# Patient Record
Sex: MALE | Race: WHITE | Employment: OTHER | URBAN - METROPOLITAN AREA
[De-identification: names, ages, dates, MRNs, and addresses within clinical notes are randomized per-mention and may not be internally consistent; named-entity substitution may affect disease eponyms.]

---

## 2016-04-12 LAB
A/G RATIO, EXTERNAL: 1.7
ALBUMIN, EXTERNAL: 4.6
ALK PHOS, EXTERNAL: 65
ANION GAP BLD CALC-SCNC: 10 MMOL/L
BILI DIRECT, EXTERNAL: NORMAL
BILI TOTAL, EXTERNAL: 0.7
BUN BLD-MCNC: 17 MG/DL
BUN/CREATININE RATIO, EXTERNAL: 18
CALCIUM, EXTERNAL: 9.4
CALCIUM, ION, EXTERNAL: NORMAL
CHLORIDE, EXTERNAL: 102
CO2, EXTERNAL: 22
CREATININE SER, EXTERNAL: 0.94
EGFR IF AFA, EXTERNAL: >60
EGFR NOT AFA, EXTERNAL: >60
GLOBULIN, EXTERNAL: 2.7
GLUCOSE SER, EXTERNAL: 109
POTASSIUM, EXTERNAL: 4
PROTEIN TOT, EXTERNAL: 7.3
SGOT (AST), EXTERNAL: 14
SGPT (ALT), EXTERNAL: 19
SODIUM, EXTERNAL: 134

## 2019-03-06 ENCOUNTER — OP HISTORICAL/CONVERTED ENCOUNTER (OUTPATIENT)
Dept: OTHER | Age: 49
End: 2019-03-06

## 2020-06-26 VITALS
SYSTOLIC BLOOD PRESSURE: 134 MMHG | BODY MASS INDEX: 40.77 KG/M2 | DIASTOLIC BLOOD PRESSURE: 77 MMHG | HEIGHT: 72 IN | WEIGHT: 301 LBS | HEART RATE: 83 BPM | TEMPERATURE: 97.4 F | OXYGEN SATURATION: 95 %

## 2020-06-26 PROBLEM — I10 ESSENTIAL HYPERTENSION: Status: ACTIVE | Noted: 2020-06-26

## 2020-06-26 PROBLEM — I82.409 DVT, LOWER EXTREMITY (HCC): Status: ACTIVE | Noted: 2020-06-26

## 2020-06-26 PROBLEM — F41.9 ANXIETY: Status: ACTIVE | Noted: 2020-06-26

## 2020-06-26 LAB
ANION GAP BLD CALC-SCNC: 11 MMOL/L
BUN BLD-MCNC: 11 MG/DL
BUN/CREATININE RATIO, EXTERNAL: 11
CALCIUM, EXTERNAL: 9.7
CHLORIDE, EXTERNAL: 103
CO2, EXTERNAL: 21
CREATININE SER, EXTERNAL: 0.96
EGFR IF AFA, EXTERNAL: >60
EGFR NOT AFA, EXTERNAL: >60
GLUCOSE SER, EXTERNAL: 120
POTASSIUM, EXTERNAL: 3.8
SODIUM, EXTERNAL: 135

## 2020-06-26 RX ORDER — SERTRALINE HYDROCHLORIDE 50 MG/1
TABLET, FILM COATED ORAL DAILY
COMMUNITY
End: 2020-09-30 | Stop reason: ALTCHOICE

## 2020-06-26 RX ORDER — TRAMADOL HYDROCHLORIDE 50 MG/1
50 TABLET ORAL 3 TIMES DAILY
COMMUNITY
End: 2020-11-13

## 2020-06-26 RX ORDER — RIVAROXABAN 20 MG/1
TABLET, FILM COATED ORAL DAILY
COMMUNITY

## 2020-06-26 RX ORDER — CLONAZEPAM 1 MG/1
TABLET ORAL 3 TIMES DAILY
COMMUNITY
End: 2020-10-12

## 2020-06-26 RX ORDER — LISINOPRIL 20 MG/1
TABLET ORAL DAILY
COMMUNITY
End: 2020-09-30 | Stop reason: SDUPTHER

## 2020-08-10 DIAGNOSIS — F41.9 ANXIETY: Primary | ICD-10-CM

## 2020-08-10 RX ORDER — CLONAZEPAM 1 MG/1
1 TABLET ORAL 2 TIMES DAILY
Qty: 60 TAB | Refills: 3 | Status: SHIPPED | OUTPATIENT
Start: 2020-08-10 | End: 2020-09-30 | Stop reason: ALTCHOICE

## 2020-09-30 ENCOUNTER — OFFICE VISIT (OUTPATIENT)
Dept: FAMILY MEDICINE CLINIC | Age: 50
End: 2020-09-30

## 2020-09-30 VITALS
HEIGHT: 72 IN | TEMPERATURE: 98.6 F | WEIGHT: 310 LBS | HEART RATE: 96 BPM | OXYGEN SATURATION: 100 % | RESPIRATION RATE: 20 BRPM | DIASTOLIC BLOOD PRESSURE: 78 MMHG | SYSTOLIC BLOOD PRESSURE: 147 MMHG | BODY MASS INDEX: 41.99 KG/M2

## 2020-09-30 DIAGNOSIS — E66.01 OBESITY, MORBID (HCC): ICD-10-CM

## 2020-09-30 DIAGNOSIS — F41.9 ANXIETY: ICD-10-CM

## 2020-09-30 DIAGNOSIS — I10 ESSENTIAL HYPERTENSION: Primary | ICD-10-CM

## 2020-09-30 DIAGNOSIS — I82.4Z9 DEEP VEIN THROMBOSIS (DVT) OF DISTAL VEIN OF LOWER EXTREMITY, UNSPECIFIED CHRONICITY, UNSPECIFIED LATERALITY (HCC): ICD-10-CM

## 2020-09-30 PROCEDURE — 99213 OFFICE O/P EST LOW 20 MIN: CPT | Performed by: FAMILY MEDICINE

## 2020-09-30 RX ORDER — LISINOPRIL 20 MG/1
20 TABLET ORAL DAILY
Qty: 90 TAB | Refills: 4 | Status: SHIPPED | OUTPATIENT
Start: 2020-09-30

## 2020-09-30 NOTE — PATIENT INSTRUCTIONS
General Health and concerns: HEART HEALTHY DIET: 
A heart healthy diet is one that is low in cholesterol (less than 300 mg daily), fat (less than 80 g daily) . You should also minimize carbohydrates / sugars (less amounts of breads, pastas, potato and potato products and sugary foods/snacks, cookies, cakes, etc) . Try to eat whole wheat/multigrain breads and pastas and eat more vegetables. Cook with olive oil (or no oil) and grill, bake, broil or boil foods. Less red meat and more chicken , fish and lean cuts of beef (limited). 8208-1353 calories per day is sufficient 1374-2133 is acceptable for weight loss. EXERCISE: 
You should do exercise 3-5 days per week (minimum) to include increasing your heart rate for 30 to 45 minutes. At least a pace of a brisk walk should do that. This build up your heart and lung endurance and muscles and helps many function of the body. OTHER: 
    Routine Health maintenance: You need to get a yearly follow up/physical exam to review, discuss age and gender appropriate exams, labs, vaccines and screening tests. This includes cardiovascular health risk, cancer screens and other muriel related topics. Medications-take all medications as directed. Please do not stop unless you talk to your doctor or health care provider first. Report any problems immediately. Referrals: if you have been given a referral, please call the office if you do not hear from provider in one week. You may make the appointment yourself. Please keep all appointments with specialists and ask them to send their notes, thoughts, recommendations to us , as your PCP. Imaging/Labs:  Be sure to get these images in a timely manner. IF your test must be scheduled, let us know if you need help getting this done and if you do not hear from that provider in a week , call us or them.   BE SURE to call the office if you do not hear regarding the results in one week after the test is performed Image or lab). It is our intention to inform you of the results ALWAYS, even if normal you should get a notification (Call, portal message). PLEASE saray if you do not get the results. PLEASE follow all recommendations and call/come in /ask questions if you do not understand of if problems develop after or in between visits. Failure to comply with recommended health care advise could result in serious health consequences. Thank you for choosing our practice and please let us know how we can help you feel better and stay well!

## 2020-09-30 NOTE — PROGRESS NOTES
ID:  Akira Sol Tamayo , 44 y.o., male  CC:   Follow up blood pressure    HPI:   Elvira Arevalo comes in for a follow up and says he is doing about the same. Medications reviewed. Patient is actually doing fair. He feels very confident and comfortable on his regimen and that it seems adequate and he is having no problems. Specifically no side effects and specifically no headaches. No chest pain, hemoptysis or wheezing. He has gained some weight and is worried about that. He continues to work as a  and fortunately, through [employer], he has had fair amount of jobs. PMHX:      Patient Active Problem List   Diagnosis Code    Anxiety F41.9    DVT, lower extremity ([facility] I82.409    Essential hypertension I10    [facility] E66.01       ALLERGIES:     No Known Allergies    MEDICATIONS:     Current Outpatient Medications:     clonazePAM (KlonoPIN) 1 mg tablet, Take  by mouth three (3) times daily. , Disp: , Rfl:     lisinopriL (PRINIVIL, ZESTRIL) 20 mg tablet, Take  by mouth daily. , Disp: , Rfl:     traMADoL (ULTRAM) 50 mg tablet, Take 50 mg by mouth three (3) times daily. , Disp: , Rfl:     rivaroxaban (Xarelto) 20 mg tab tablet, Take  by mouth daily. , Disp: , Rfl:     sertraline (ZOLOFT) 50 mg tablet, Take  by mouth daily. , Disp: , Rfl:     SOCIAL:   Social History     Tobacco Use    Smoking status: Former Smoker    Smokeless tobacco: Never Used   Substance Use Topics    Alcohol use: Yes     Frequency: Monthly or less     Drinks per session: 3 or 4     Comment: occastional    Drug use: Never       SURGERIES:     History reviewed. No pertinent surgical history.      FAMILY HX:       Family History   Problem Relation Age of Onset    Diabetes Mother     Hypertension Mother     Cancer Brother         pharynx and prostate       Review of systems:     I personally collected this information from the patient , available records and family members present-RU  Review of Systems Constitutional: Positive for malaise/fatigue. Negative for chills, diaphoresis, fever and weight loss. HENT: Negative for congestion, ear pain, hearing loss, nosebleeds, sinus pain, sore throat and tinnitus. Eyes: Negative for blurred vision, double vision, photophobia and pain. Respiratory: Negative for cough, sputum production and shortness of breath. Cardiovascular: Negative for chest pain, palpitations, orthopnea, claudication, leg swelling and PND. Gastrointestinal: Negative for abdominal pain, blood in stool, constipation, diarrhea, heartburn, melena, nausea and vomiting. Genitourinary: Negative for dysuria, frequency and urgency. Musculoskeletal: Positive for back pain and joint pain. Negative for falls, myalgias and neck pain. Skin: Negative for itching and rash. Neurological: Negative for dizziness, tingling, tremors, sensory change, focal weakness and headaches. Psychiatric/Behavioral: Negative for depression and suicidal ideas. The patient is nervous/anxious. The patient does not have insomnia. Vitals:     Visit Vitals  BP (!) 147/78 (BP 1 Location: Right arm, BP Patient Position: Sitting)   Pulse 96   Temp 98.6 °F (37 °C)   Resp 20   Ht 6' (1.829 m)   Wt 310 lb (140.6 kg)   SpO2 100%   BMI 42.04 kg/m²           PHYSICAL:     Physical Exam  Constitutional:       General: He is not in acute distress. Appearance: Normal appearance. He is obese. He is not ill-appearing. HENT:      Nose: No congestion or rhinorrhea. Mouth/Throat:      Mouth: Mucous membranes are moist.      Pharynx: Oropharynx is clear. No oropharyngeal exudate or posterior oropharyngeal erythema. Eyes:      General: No scleral icterus. Extraocular Movements: Extraocular movements intact. Conjunctiva/sclera: Conjunctivae normal.      Pupils: Pupils are equal, round, and reactive to light. Neck:      Musculoskeletal: No neck rigidity or muscular tenderness. Vascular: No carotid bruit. Cardiovascular:      Rate and Rhythm: Normal rate and regular rhythm. Heart sounds: No murmur. No friction rub. No gallop. Pulmonary:      Effort: Pulmonary effort is normal.      Breath sounds: Normal breath sounds. No wheezing, rhonchi or rales. Abdominal:      General: Bowel sounds are normal. There is no distension. Palpations: Abdomen is soft. There is no mass. Tenderness: There is no abdominal tenderness. Musculoskeletal:         General: No swelling, tenderness or deformity. Right lower leg: No edema. Left lower leg: No edema. Lymphadenopathy:      Cervical: No cervical adenopathy. Skin:     Capillary Refill: Capillary refill takes less than 2 seconds. Coloration: Skin is not jaundiced. Findings: No erythema or rash. Neurological:      General: No focal deficit present. Mental Status: He is alert and oriented to person, place, and time. Cranial Nerves: No cranial nerve deficit. Sensory: No sensory deficit. Coordination: Coordination normal.      Gait: Gait (ambulation status: ) normal.   Psychiatric:         Mood and Affect: Mood normal.         Behavior: Behavior normal.         Thought Content: Thought content normal.         Judgment: Judgment normal.         ASSESSMENT/PLAN:      1. Obesity, morbid ([address]      2. Essential hypertension    - lisinopriL (PRINIVIL, ZESTRIL) 20 mg tablet; Take 1 Tab by mouth daily. Dispense: 90 Tab; Refill: 4    3. Anxiety      4. Deep vein thrombosis (DVT) of distal vein of lower extremity, unspecified chronicity, unspecified laterality (HCC)            Discussion:    The patient and I discussed the following items/issues; Each diagnosis listed for today's visit including medications, treatment, testing such as labs, imagine, referrals and when to call regarding results and appointments. Reminded patient to keep any and all appointments with specialists, labs, imaging.    Reminded patient to make sure we get copies of any specialists care, labs and imaging. Reminded patient to call of come by the office if there are any concerns, questions , comments or problems. The patient verbalized understanding of the care plan and all questions were answered to the patient's satisfaction prior to leaving the office. The patient was told that failure to comply with recommended testing could result in abnormal health consequences. The patient was instructed to have yearly routine health maintenance including but not limited to age appropriate vaccines, testing, screening exams. The patient actively participated in medical decision making. FOLLOW UP:   Patient knows to keep any and all future visits scheduled unless told otherwise. Patient knows to call, come back if any concerns, questions, comments or problems arise. Follow-up and Dispositions    · Return in about 6 months (around 3/30/2021) for follow up blood pressure, dvt, weight and needs labs. This visit may have been completed , in part, with voice recognition software as well as typing and may have syntax errors despite editing.       Rikki Laureano, DO

## 2020-10-12 DIAGNOSIS — F41.9 ANXIETY: Primary | ICD-10-CM

## 2020-10-12 RX ORDER — CLONAZEPAM 1 MG/1
TABLET ORAL
Qty: 90 TAB | Refills: 0 | Status: SHIPPED | OUTPATIENT
Start: 2020-10-12 | End: 2020-11-13

## 2020-11-13 DIAGNOSIS — M79.606 PAIN OF LOWER EXTREMITY, UNSPECIFIED LATERALITY: Primary | ICD-10-CM

## 2020-11-13 DIAGNOSIS — F41.9 ANXIETY: ICD-10-CM

## 2020-11-13 RX ORDER — TRAMADOL HYDROCHLORIDE 50 MG/1
TABLET ORAL
Qty: 90 TAB | Refills: 3 | Status: SHIPPED | OUTPATIENT
Start: 2020-11-13 | End: 2020-12-13

## 2020-11-13 RX ORDER — CLONAZEPAM 1 MG/1
TABLET ORAL
Qty: 90 TAB | Refills: 0 | Status: SHIPPED | OUTPATIENT
Start: 2020-11-13 | End: 2020-12-11

## 2020-11-13 NOTE — TELEPHONE ENCOUNTER
44 y.o. , Damon Dan , male       No Known Allergies  Current Outpatient Medications on File Prior to Visit   Medication Sig Dispense Refill    [DISCONTINUED] clonazePAM (KlonoPIN) 1 mg tablet TAKE 1 TABLET BY MOUTH THREE TIMES DAILY AS NEEDED FOR ANXIETY 90 Tab 0    lisinopriL (PRINIVIL, ZESTRIL) 20 mg tablet Take 1 Tab by mouth daily. 90 Tab 4    rivaroxaban (Xarelto) 20 mg tab tablet Take  by mouth daily.  [DISCONTINUED] traMADoL (ULTRAM) 50 mg tablet Take 50 mg by mouth three (3) times daily. No current facility-administered medications on file prior to visit. Patient Active Problem List   Diagnosis Code    Anxiety F41.9    DVT, lower extremity ([facility] I82.409    Essential hypertension I10    Ob[facility], morbid ([email or web address] E66.01       1. Anxiety    - clonazePAM (KlonoPIN) 1 mg tablet; TAKE 1 TABLET BY MOUTH THREE TIMES DAILY AS NEEDED FOR ANXIETY  Dispense: 90 Tab; Refill: 0    2. Pain of lower extremity, unspecified laterality    - traMADoL (ULTRAM) 50 mg tablet; TAKE 1 TABLET BY MOUTH THREE TIMES DAILY AS NEEDED FOR PAIN  Dispense: 90 Tab; Refill: 3      Record reviewed and patient has  declined narcan in past.   reviewed. NO indicators for diversion or misuse. They do allow him to function and work as a .   Has severe, quality of life altering panic attacks and if he doesn't take the clonazepam he is nto able to function  Delroy Clement, DO

## 2020-12-11 DIAGNOSIS — F41.9 ANXIETY: ICD-10-CM

## 2020-12-11 RX ORDER — CLONAZEPAM 1 MG/1
TABLET ORAL
Qty: 90 TAB | Refills: 0 | Status: SHIPPED | OUTPATIENT
Start: 2020-12-11 | End: 2020-12-14 | Stop reason: SDUPTHER

## 2020-12-11 NOTE — TELEPHONE ENCOUNTER
44 y.o. , Batsheva Zaldivar , male       No Known Allergies  Current Outpatient Medications on File Prior to Visit   Medication Sig Dispense Refill    traMADoL (ULTRAM) 50 mg tablet TAKE 1 TABLET BY MOUTH THREE TIMES DAILY AS NEEDED FOR PAIN 90 Tab 3    [DISCONTINUED] clonazePAM (KlonoPIN) 1 mg tablet TAKE 1 TABLET BY MOUTH THREE TIMES DAILY AS NEEDED FOR ANXIETY 90 Tab 0    lisinopriL (PRINIVIL, ZESTRIL) 20 mg tablet Take 1 Tab by mouth daily. 90 Tab 4    rivaroxaban (Xarelto) 20 mg tab tablet Take  by mouth daily. No current facility-administered medications on file prior to visit. Patient Active Problem List   Diagnosis Code    Anxiety F41.9    DVT, lower extremity ([facility] I82.409    Essential hypertension I10    [facility], [facility] E66.01         ICD-10-CM ICD-9-CM    1. Anxiety  F41.9 300.00 clonazePAM (KlonoPIN) 1 mg tablet       This patient takes a benzodiazepine. Therefore the  and chart were reviewed today noting the following;  MEDICATION: CLonazepam  REASON: Anxiety disorder  OPIOID PAIN MEDS PRESCRIBED BY ANY PROVIDER(If so NARCAN needs to be prescribed) : Tramadol by myself. Has refused Narcan. Been on this dose for a long time. Uses sparingly   LAST REFILL: 11-  DISCUSSION:  Patient will conitnue same regimen, it does allow him to function and MME with tramadol is 15 and low dose benzo. Does need face to face to discuss possibly decreasing usage but he has tried and thins this regimen does allow better function and quality of life. Especially with respect to anxiety.          Cr Seats, DO

## 2020-12-11 NOTE — TELEPHONE ENCOUNTER
Patient called and wanted to know if he can get his prescription clonazapam 1 mg tablet refilled. He stated [facility] had called but still had not heard anything.

## 2020-12-14 ENCOUNTER — TELEPHONE (OUTPATIENT)
Dept: FAMILY MEDICINE CLINIC | Age: 50
End: 2020-12-14

## 2020-12-14 DIAGNOSIS — R05.9 COUGH: Primary | ICD-10-CM

## 2020-12-14 DIAGNOSIS — F41.9 ANXIETY: ICD-10-CM

## 2020-12-14 RX ORDER — AZITHROMYCIN 250 MG/1
TABLET, FILM COATED ORAL
Qty: 6 TAB | Refills: 0 | Status: SHIPPED | OUTPATIENT
Start: 2020-12-14 | End: 2020-12-19

## 2020-12-14 RX ORDER — CLONAZEPAM 1 MG/1
TABLET ORAL
Qty: 90 TAB | Refills: 3 | Status: SHIPPED | OUTPATIENT
Start: 2020-12-14 | End: 2021-03-31 | Stop reason: SDUPTHER

## 2020-12-14 NOTE — TELEPHONE ENCOUNTER
44 y.o. , Jose Blank , male No Known Allergies Current Outpatient Medications on File Prior to Visit Medication Sig Dispense Refill  [DISCONTINUED] clonazePAM (KlonoPIN) 1 mg tablet TAKE 1 TABLET BY MOUTH THREE TIMES DAILY AS NEEDED FOR ANXIETY 90 Tab 0  [] traMADoL (ULTRAM) 50 mg tablet TAKE 1 TABLET BY MOUTH THREE TIMES DAILY AS NEEDED FOR PAIN 90 Tab 3  
 lisinopriL (PRINIVIL, ZESTRIL) 20 mg tablet Take 1 Tab by mouth daily. 90 Tab 4  
 rivaroxaban (Xarelto) 20 mg tab tablet Take  by mouth daily. No current facility-administered medications on file prior to visit. Patient Active Problem List  
Diagnosis Code  Anxiety F41.9  DVT, lower extremity ([facility] I82.409  Essential hypertension I10  
 [facility], [facility] E66.01  
 
 
  ICD-10-CM ICD-9-CM 1. Cough  R05 786.2 azithromycin (ZITHROMAX) 250 mg tablet 2. Anxiety  F41.9 300.00 clonazePAM (KlonoPIN) 1 mg tablet Ramon Mckeon DO

## 2020-12-14 NOTE — TELEPHONE ENCOUNTER
Pt left message. C/o chest congestion. No other symptoms. Asking if you can send in rx for antibiotics and/or steroids?

## 2021-03-31 ENCOUNTER — OFFICE VISIT (OUTPATIENT)
Dept: FAMILY MEDICINE CLINIC | Age: 51
End: 2021-03-31

## 2021-03-31 VITALS
WEIGHT: 311 LBS | HEIGHT: 72 IN | HEART RATE: 95 BPM | DIASTOLIC BLOOD PRESSURE: 68 MMHG | BODY MASS INDEX: 42.12 KG/M2 | TEMPERATURE: 97.8 F | SYSTOLIC BLOOD PRESSURE: 131 MMHG | OXYGEN SATURATION: 98 %

## 2021-03-31 DIAGNOSIS — F41.9 ANXIETY: Primary | ICD-10-CM

## 2021-03-31 DIAGNOSIS — M54.50 CHRONIC BILATERAL LOW BACK PAIN WITHOUT SCIATICA: ICD-10-CM

## 2021-03-31 DIAGNOSIS — G89.29 CHRONIC BILATERAL LOW BACK PAIN WITHOUT SCIATICA: ICD-10-CM

## 2021-03-31 DIAGNOSIS — M79.606 PAIN OF LOWER EXTREMITY, UNSPECIFIED LATERALITY: ICD-10-CM

## 2021-03-31 DIAGNOSIS — E66.01 OBESITY, MORBID (HCC): ICD-10-CM

## 2021-03-31 DIAGNOSIS — Z91.09 ENVIRONMENTAL ALLERGIES: ICD-10-CM

## 2021-03-31 DIAGNOSIS — I10 ESSENTIAL HYPERTENSION: ICD-10-CM

## 2021-03-31 PROCEDURE — 99213 OFFICE O/P EST LOW 20 MIN: CPT | Performed by: FAMILY MEDICINE

## 2021-03-31 RX ORDER — CETIRIZINE HCL 10 MG
10 TABLET ORAL DAILY
Qty: 30 TAB | Refills: 1 | Status: SHIPPED | OUTPATIENT
Start: 2021-03-31

## 2021-03-31 RX ORDER — CLONAZEPAM 1 MG/1
TABLET ORAL
Qty: 90 TAB | Refills: 3 | Status: SHIPPED | OUTPATIENT
Start: 2021-03-31 | End: 2021-07-08 | Stop reason: DRUGHIGH

## 2021-03-31 RX ORDER — TRAMADOL HYDROCHLORIDE 50 MG/1
50 TABLET ORAL
Qty: 90 TAB | Refills: 3 | Status: SHIPPED | OUTPATIENT
Start: 2021-03-31 | End: 2021-04-30

## 2021-03-31 NOTE — PROGRESS NOTES
IDENTIFYING INFORMATION:  Charmaine Quiroz. Maria Guadalupe Cavazos , 46 y.o., male  [address] [place],  [address] [id number]     CHIEF COMPLAINT:   Chief Complaint   Patient presents with    Hypertension    Blood Clot     HISTORY OF PRESENT ILLNESS:  Hernandez Phillips is a 46 y.o. male  has a past medical history of Anxiety (6/26/2020), DVT, lower extremity ([address] (6/26/2020), and Essential hypertension (6/26/2020). .  he comes in for follow-up on his anxiety, DVTs and hypertension. He has no major problems. His weight is stable but he is trying to lose weight. He still works as a  and up and down [employer] all day. He has lumbar back pain tightness and hip pain occasionally. He does take tramadol. He may take 2 or 3 tablets in a days time and then might not have to take any for a few days. He does have a history of a blood clot and is currently still on the Xarelto. Therefore, he is not a good candidate for the anti-inflammatories but again he uses the tramadol sparingly. He also takes clonazepam for anxiety and that is something that he does need to take more frequent. He has no major complaints today. PAST MEDICAL HISTORY:   Past Medical History:   Diagnosis Date    Anxiety 6/26/2020    DVT, lower extremity ([address] 6/26/2020    Essential hypertension 6/26/2020       MEDICATIONS:   Current Outpatient Medications on File Prior to Visit   Medication Sig Dispense Refill    clonazePAM (KlonoPIN) 1 mg tablet TAKE 1 TABLET BY MOUTH THREE TIMES DAILY AS NEEDED FOR ANXIETY 90 Tab 3    lisinopriL (PRINIVIL, ZESTRIL) 20 mg tablet Take 1 Tab by mouth daily. 90 Tab 4    rivaroxaban (Xarelto) 20 mg tab tablet Take  by mouth daily. No current facility-administered medications on file prior to visit.         ALLERGIES:  No Known Allergies      SOCIAL HISTORY:   Social History     Tobacco Use    Smoking status: Former Smoker    Smokeless tobacco: Never Used   Substance Use Topics    Alcohol use: Yes     Frequency: Monthly or less     Drinks per session: 3 or 4     Comment: occastional    Drug use: Never       SURGICAL HISTORY:  History reviewed. No pertinent surgical history. FAMILY HISTORY:  Family History   Problem Relation Age of Onset    Diabetes Mother     Hypertension Mother     Cancer Brother         pharynx and prostate         REVIEW OF SYSTEMS:  I personally collected this information from all available source present (patient/others in room and records available) -[email or web address]    Review of Systems   Constitutional: Negative for activity change, appetite change, chills, diaphoresis, fever and unexpected weight change. HENT: Negative for congestion, ear discharge, ear pain, hearing loss, rhinorrhea, sinus pressure, sneezing, sore throat, tinnitus, trouble swallowing and voice change. Eyes: Negative for photophobia, pain, discharge and visual disturbance. Respiratory: Negative for cough, chest tightness, shortness of breath and wheezing. Cardiovascular: Negative for chest pain, palpitations and leg swelling. Gastrointestinal: Negative for abdominal distention, abdominal pain, blood in stool, constipation, diarrhea, nausea and vomiting. Endocrine: Negative for cold intolerance, heat intolerance, polydipsia and polyphagia. Genitourinary: Negative for difficulty urinating, dysuria, frequency, hematuria and urgency. Musculoskeletal: Positive for arthralgias, back pain, joint swelling and myalgias. Negative for gait problem and neck pain. Skin: Negative for color change and rash. Neurological: Negative for dizziness, tremors, syncope, speech difficulty, weakness, light-headedness, numbness and headaches. Hematological: Negative for adenopathy. Does not bruise/bleed easily. Psychiatric/Behavioral: Negative for agitation, behavioral problems, confusion, decreased concentration, dysphoric mood, hallucinations, sleep disturbance and suicidal ideas. The patient is nervous/anxious.            PHYSICAL EXAMINATION:  Vital Signs:    Visit Vitals  /68 (BP 1 Location: Left upper arm, BP Patient Position: Sitting)   Pulse 95   Temp 97.8 °F (36.6 °C) (Temporal)   Ht 6' (1.829 m)   Wt 311 lb (141.1 kg)   SpO2 98%   BMI 42.18 kg/m²         Wt Readings from Last 3 Encounters:   03/31/21 311 lb (141.1 kg)   09/30/20 310 lb (140.6 kg)   02/11/20 301 lb (136.5 kg)     BP Readings from Last 3 Encounters:   03/31/21 131/68   09/30/20 (!) 147/78   02/11/20 134/77         Physical Exam  Nursing note reviewed. Constitutional:       General: He is not in acute distress. Appearance: Normal appearance. He is obese. He is not ill-appearing or toxic-appearing. Eyes:      General: No scleral icterus. Extraocular Movements: Extraocular movements intact. Conjunctiva/sclera: Conjunctivae normal.      Pupils: Pupils are equal, round, and reactive to light. Neck:      Musculoskeletal: No neck rigidity or muscular tenderness. Vascular: No carotid bruit. Cardiovascular:      Rate and Rhythm: Normal rate and regular rhythm. Heart sounds: No murmur. No friction rub. No gallop. Pulmonary:      Effort: Pulmonary effort is normal.      Breath sounds: Normal breath sounds. No wheezing, rhonchi or rales. Abdominal:      General: Bowel sounds are normal. There is no distension. Palpations: Abdomen is soft. There is no mass. Tenderness: There is no abdominal tenderness. Musculoskeletal:         General: Tenderness and deformity present. No swelling. Right lower leg: No edema. Left lower leg: No edema. Comments: Very tight and tender T10-L2 left and bilaterally L5. These are very tight muscle groups and tender muscle knots. Lymphadenopathy:      Cervical: No cervical adenopathy. Skin:     Capillary Refill: Capillary refill takes less than 2 seconds. Coloration: Skin is not jaundiced. Findings: No erythema or rash. Neurological:      General: No focal deficit present. Mental Status: He is alert and oriented to person, place, and time. Mental status is at baseline. Cranial Nerves: No cranial nerve deficit. Sensory: No sensory deficit. Coordination: Coordination normal.      Gait: Gait normal.   Psychiatric:         Mood and Affect: Mood normal.         Behavior: Behavior normal.         Thought Content: Thought content normal.         Judgment: Judgment normal.         ASSESSMENT/PLAN:    Discussion (regarding today's visit with Robbie Mauriciot); Peg Elkins seems to be doing fairly well with his medicine. The clonazepam is a very mild benzo and the tramadol is very mild narcotic. The 2 together can increase his risk for overdose and/or death and he was so advised. He does not want Narcan prescription because he says it do not bother him and he does not take them other than as prescribed. This is confirmed on the prescription monitoring program and medical records where there is no indicators of misuse or diversion and he certainly indicates that the medicine does help his quality of life. I did add a little bit of cetirizine which he can get over-the-counter if his can afford it by prescription because it is not being relieved by the [facility] so he will stop the Allegra. I did recommend taking the cetirizine at night due to a little bit of increased potential for somnolence. A lot of times we did have to switch folks the back and forth between antihistamines he can certainly hold onto his Allegra for now. ICD-10-CM ICD-9-CM    1. Anxiety  F41.9 300.00 clonazePAM (KlonoPIN) 1 mg tablet   2. Pain of lower extremity, unspecified laterality  M79.606 729.5 traMADoL (ULTRAM) 50 mg tablet   3. Obesity, morbid ([facility]  E66.01 278.01    4. Essential hypertension  I10 401.9    5. Chronic bilateral low back pain without sciatica  M54.5 724.2 traMADoL (ULTRAM) 50 mg tablet    G89.29 338.29    6.  Environmental allergies  Z91.09 V15.09 cetirizine (ZYRTEC) 10 mg tablet WE reviewed each diagnosis listed for today's visit including medications, treatment, testing such as labs, imagine, referrals and when to call regarding results and appointments. Reminded patient to keep any and all appointments with specialists, labs, imaging. Reminded patient to make sure we get copies of any specialists care, labs and imaging. Reminded patient to call of come by the office if there are any concerns, questions , comments or problems. The patient verbalized understanding of the care plan and all questions were answered to the patient's satisfaction prior to leaving the office. The patient was told that failure to comply with recommended testing could result in abnormal health consequences. The patient was instructed to have yearly routine health maintenance including but not limited to age appropriate vaccines, testing, screening exams. ALL questions were answered to his satisfaction before leaving the office. The patient actively participated in medical decision making. FOLLOW UP:   Patient knows to keep any and all future visits scheduled unless told otherwise. Patient knows to call, come back if any concerns, questions, comments or problems arise. Follow-up and Dispositions    · Return in about 6 months (around 9/30/2021) for Follow up anxiety, htn, back pain. This visit may have been completed , in part, with voice recognition software as well as typing and may have syntax errors despite editing.       Catrachito Kruger, DO

## 2021-03-31 NOTE — PATIENT INSTRUCTIONS
     
Routine Health maintenance: You need to get a yearly follow up/physical exam to review, discuss age and gender appropriate exams, labs, vaccines and screening tests. This includes cardiovascular health risk, cancer screens and other muriel related topics. Medications-Take all medications as directed. Please do not stop unless you talk to your doctor or health care provider first. Report any problems immediately. Referrals: if you have been given a referral, please call the office if you do not hear from provider in one week. You may make the appointment yourself. Please keep all appointments with specialists and ask them to send their notes, thoughts, recommendations to us , as your PCP. Imaging/Labs:  Be sure to get these images in a timely manner. IF your test must be scheduled, let us know if you need help getting this done and if you do not hear from that provider in a week , call us or them.   BE SURE to call the office if you do not hear regarding the results in one week after the test is performed Image or lab). It is our intention to inform you of the results ALWAYS, even if normal you should get a notification (Call, portal message). PLEASE saray if you do not get the results. PLEASE follow all recommendations and call/come in /ask questions if you do not understand of if problems develop after or in between visits. Failure to comply with recommended health care advise could result in serious health consequences. Thank you for choosing our practice and please let us know how we can help you feel better and stay well! YOU have been given a prescription for a medication called a benzodiazepine. It is for sleep/anxiety (clonazepam) . You also have a prescription for pain medication on file (tramadol)  The combination increases the risk for overdose, mental impairment, disability and /or death. You need to have a prescription for Narcan on hand at all times. Other medications and illnesses can also alter the absorption and increase this risk as well. Please consider either taking less or even stopping one or the other.

## 2021-03-31 NOTE — PROGRESS NOTES
1. Have you been to the ER, urgent care clinic since your last visit? Hospitalized since your last visit? No    2. Have you seen or consulted any other health care providers outside of the [address] [place] [address] since your last visit? Include any pap smears or colon screening.  Saw Dr. Arcenio De La Vega in January for FU     Chief Complaint   Patient presents with    Hypertension    Blood Clot     Visit Vitals  BP (!) 142/78 (BP 1 Location: Left upper arm, BP Patient Position: Sitting)   Pulse (!) 107   Temp 97.8 °F (36.6 °C) (Temporal)   Ht 6' (1.829 m)   Wt 311 lb (141.1 kg)   SpO2 98%   BMI 42.18 kg/m²

## 2021-04-29 ENCOUNTER — TELEPHONE (OUTPATIENT)
Dept: FAMILY MEDICINE CLINIC | Age: 51
End: 2021-04-29

## 2021-04-29 NOTE — TELEPHONE ENCOUNTER
Pt left message. States that the pollen has really gotten to him. Has drainage in throat that's going into his chest.  Has been taking OTC Mucinex and able to get some up. Asking if you can send something in that would help better?

## 2021-05-06 ENCOUNTER — TELEPHONE (OUTPATIENT)
Dept: FAMILY MEDICINE CLINIC | Age: 51
End: 2021-05-06

## 2021-05-06 DIAGNOSIS — J01.10 ACUTE NON-RECURRENT FRONTAL SINUSITIS: Primary | ICD-10-CM

## 2021-05-06 RX ORDER — AMOXICILLIN 500 MG/1
500 CAPSULE ORAL 3 TIMES DAILY
Qty: 30 CAP | Refills: 0 | Status: SHIPPED | OUTPATIENT
Start: 2021-05-06 | End: 2021-05-16

## 2021-05-06 NOTE — TELEPHONE ENCOUNTER
Identifying Data:  46 y.o. , Bessie Frausto , male     HISTORICAL DATA (REVIEWED TODAY):  ALLERGIES-    No Known Allergies    MEDICATION AS OF LAST RECONCILIATION (NOT INCLUDING CHANGES MADE TODAY):    Current Outpatient Medications on File Prior to Visit   Medication Sig Dispense Refill    clonazePAM (KlonoPIN) 1 mg tablet TAKE 1 TABLET BY MOUTH THREE TIMES DAILY AS NEEDED FOR ANXIETY 90 Tab 3    cetirizine (ZYRTEC) 10 mg tablet Take 1 Tab by mouth daily. 30 Tab 1    lisinopriL (PRINIVIL, ZESTRIL) 20 mg tablet Take 1 Tab by mouth daily. 90 Tab 4    rivaroxaban (Xarelto) 20 mg tab tablet Take  by mouth daily. No current facility-administered medications on file prior to visit. PAST MEDICAL HISTORY:    Patient Active Problem List   Diagnosis Code    Anxiety F41.9    DVT, lower extremity ([facility] I82.409    Essential hypertension I10    Obesity, morbid ([facility] E66.01       ASSESSMENT AND PLAN:    1. Acute non-recurrent frontal sinusitis    - amoxicillin (AMOXIL) 500 mg capsule; Take 1 Cap by mouth three (3) times daily for 10 days. Dispense: 30 Cap;  Refill: 0          Kiana Barbi, DO

## 2021-05-06 NOTE — TELEPHONE ENCOUNTER
Pt left message. States that he is still having congestion in chest/nose. Thinks he has a sinus infection. He is blowing and cough up a little green mucous.

## 2021-06-28 ENCOUNTER — TELEPHONE (OUTPATIENT)
Dept: FAMILY MEDICINE CLINIC | Age: 51
End: 2021-06-28

## 2021-06-28 NOTE — TELEPHONE ENCOUNTER
Pt notified that he would need to contact the  to schedule an appt with one of the other providers to discuss getting refills.

## 2021-07-08 ENCOUNTER — OFFICE VISIT (OUTPATIENT)
Dept: FAMILY MEDICINE CLINIC | Age: 51
End: 2021-07-08

## 2021-07-08 VITALS
WEIGHT: 311 LBS | DIASTOLIC BLOOD PRESSURE: 69 MMHG | HEART RATE: 94 BPM | SYSTOLIC BLOOD PRESSURE: 146 MMHG | OXYGEN SATURATION: 98 % | RESPIRATION RATE: 18 BRPM | HEIGHT: 72 IN | BODY MASS INDEX: 42.12 KG/M2 | TEMPERATURE: 98 F

## 2021-07-08 DIAGNOSIS — Z02.83 ENCOUNTER FOR DRUG SCREENING: ICD-10-CM

## 2021-07-08 DIAGNOSIS — F41.9 ANXIETY: Primary | ICD-10-CM

## 2021-07-08 DIAGNOSIS — M54.41 CHRONIC MIDLINE LOW BACK PAIN WITH BILATERAL SCIATICA: ICD-10-CM

## 2021-07-08 DIAGNOSIS — M54.42 CHRONIC MIDLINE LOW BACK PAIN WITH BILATERAL SCIATICA: ICD-10-CM

## 2021-07-08 DIAGNOSIS — G89.29 CHRONIC MIDLINE LOW BACK PAIN WITH BILATERAL SCIATICA: ICD-10-CM

## 2021-07-08 PROBLEM — D68.51 FACTOR V LEIDEN (HCC): Status: ACTIVE | Noted: 2021-07-08

## 2021-07-08 PROCEDURE — 99214 OFFICE O/P EST MOD 30 MIN: CPT | Performed by: NURSE PRACTITIONER

## 2021-07-08 RX ORDER — TRAMADOL HYDROCHLORIDE 50 MG/1
TABLET ORAL
COMMUNITY
Start: 2021-05-28 | End: 2021-07-08 | Stop reason: DRUGHIGH

## 2021-07-08 RX ORDER — CLONAZEPAM 1 MG/1
TABLET ORAL
Qty: 45 TABLET | Refills: 0 | Status: SHIPPED | OUTPATIENT
Start: 2021-07-08 | End: 2021-08-06 | Stop reason: DRUGHIGH

## 2021-07-08 RX ORDER — TRAMADOL HYDROCHLORIDE 50 MG/1
TABLET ORAL
Qty: 24 TABLET | Refills: 0 | Status: SHIPPED | OUTPATIENT
Start: 2021-07-08 | End: 2021-07-29

## 2021-07-08 NOTE — LETTER
7/8/2021 10:53 AM    Mr. Keith Ceja  [address] [address]      Dear Mr [facility],    This letter serves as notice for changes on care for your anxiety and pain management. We will no longer be able to prescribe clonazepam and tramadol  for you    I saw you on 7/8/2021 for evaluation and treatment of your anxiety and pain. During the visit we discussed your current treatment plan as well as follow-up care. At the time of the visit I provided you with a 30-day taper of your current medications as follows:    Clonazepam:  Take ½ tablet in the morning and 1 tablet  daily for 4 weeks. I can do a refill for this dose while you are finding a psychiatrist.     Then take 1 tablet daily x 4 weeks. Then  ½ tablet every other evening for 1 week, then stop. Tramadol:  1 tablet 2 times per day for 7 days, then 1 tablet 1 times per day for 7 days, then 1/2  tablet daily for 7 days, then stop. If you need help coming off these medications, please call one of the following numbers:    [facility] [phone]    [facility] [phone]    [facility] [phone]    Or you may call or go to your local emergency room.         Sincerely,            Skeeter Gaucher, NP

## 2021-07-08 NOTE — PROGRESS NOTES
Visit Vitals  BP (!) 146/69 (BP 1 Location: Left arm, BP Patient Position: Sitting, BP Cuff Size: Adult)   Pulse 94   Temp 98 °F (36.7 °C) (Temporal)   Resp 18   Ht 6' (1.829 m)   Wt 311 lb (141.1 kg)   SpO2 98%   BMI 42.18 kg/m²     Chief Complaint   Patient presents with    Medication Refill     clonazepam and tramadol-works as a

## 2021-07-08 NOTE — PROGRESS NOTES
Chief Complaint   Patient presents with    Medication Refill     clonazepam and tramadol-works as a      Visit Vitals  BP (!) 146/69 (BP 1 Location: Left arm, BP Patient Position: Sitting, BP Cuff Size: Adult)   Pulse 94   Temp 98 °F (36.7 °C) (Temporal)   Resp 18   Ht 6' (1.829 m)   Wt 311 lb (141.1 kg)   SpO2 98%   BMI 42.18 kg/m²     Tabatha Stokes is a 46 y.o. male. HPI:  Mikayla Moore  presented with request for medication refills for clonazepam and tramadol. Review of his record was significant for dxes of chronic midline low back pain w/ bilateral sciatica, anxiety, hypertension, and Factor V Leiden disorder. He also has hx of DVT and is taking Xarelto daily. He sees hematologist Dr. Brandi Flannery at [facility] every 6 mo for f/u. Pt stated that he saw an ortho provider in 2016- [facility]. Shots were mentioned for his back pain. However, he does not have insurance so did not go back for shots. Strongly encouraged pt to look for affordable insurance per the insurance exchanges which have extended open enrollment period to Aug 2021. This would allow him greater access to care for his back pain and anxiety issues. Review of PDMP website indicated that pt is ordered clonazepam 1 mg 90 tabs x 30 days for up to 3 x day use. Also ordered tramadol 50 mg 90 tabs x 30 days for up to 3 x day use. Last refilled on 5/27/2021 and 5/28/2021 respectively. Not using the tramadol daily most of the time. Advised pt that I as a rule do not like to order multiple daily doses for long term use of BZDs due to risk of tolerance, addiction and potential for abuse in any pt. Also, I am not a chronic pain specialist and our clinic is not a chronic pain clinic. He would be better served by establishing w/ a pain  for his chronic pain. Pt was given a list of pain control specialists to call for an appt for management of his chronic pain.    Advised that I would start a taper of his clonazepam and can continue until he is tapered off or has found a new provider willing to manage anxiety medication. Patient Active Problem List   Diagnosis Code    Anxiety F41.9    DVT, lower extremity ([facility] I82.409    Essential hypertension I10    Obesity, morbid ([facility] E66.01    Factor V Leiden ([facility] D68.51     Past Medical History:   Diagnosis Date    Anxiety 2020    DVT, lower extremity ([facility] 2020    Essential hypertension 2020     History reviewed. No pertinent surgical history. Current Outpatient Medications   Medication Instructions    cetirizine (ZYRTEC) 10 mg, Oral, DAILY    clonazePAM (KlonoPIN) 1 mg tablet Take 1/2 tab in am and 1 tab daily as needed for anxiety.  lisinopriL (PRINIVIL, ZESTRIL) 20 mg, Oral, DAILY    rivaroxaban (Xarelto) 20 mg tab tablet Oral, DAILY     No Known Allergies  Social History     Tobacco Use    Smoking status: Former Smoker     Packs/day: 1.00     Types: Cigarettes     Quit date:      Years since quittin.5    Smokeless tobacco: Never Used   Vaping Use    Vaping Use: Never used   Substance Use Topics    Alcohol use: Yes     Comment: occastional    Drug use: Never     Family History   Problem Relation Age of Onset    Diabetes Mother     Hypertension Mother     Cancer Brother         pharynx and prostate     Review of Systems   Constitutional: Negative for chills and fever. HENT: Negative for congestion, ear pain and sore throat. Respiratory: Negative for cough, shortness of breath and wheezing. Cardiovascular: Negative for chest pain, palpitations and leg swelling. Gastrointestinal: Negative for abdominal pain, constipation, diarrhea, nausea and vomiting. Musculoskeletal: Positive for back pain. Negative for joint pain, myalgias and neck pain. Neurological: Negative for dizziness, tingling, sensory change, weakness and headaches. Psychiatric/Behavioral: Negative for depression. The patient is nervous/anxious. The patient does not have insomnia. Objective  Physical Exam  Vitals reviewed. Constitutional:       General: He is not in acute distress. Appearance: Normal appearance. HENT:      Head: Normocephalic. Right Ear: External ear normal.      Left Ear: External ear normal.      Nose: Nose normal.   Eyes:      Conjunctiva/sclera: Conjunctivae normal.   Cardiovascular:      Rate and Rhythm: Normal rate. Pulmonary:      Effort: Pulmonary effort is normal. No respiratory distress. Musculoskeletal:         General: No swelling. Cervical back: Neck supple. Right lower leg: No edema. Left lower leg: No edema. Skin:     Coloration: Skin is not jaundiced. Findings: No rash. Neurological:      Mental Status: He is alert and oriented to person, place, and time. Psychiatric:         Mood and Affect: Mood normal.         Behavior: Behavior normal.         Thought Content: Thought content normal.         Judgment: Judgment normal.       Assessment & Plan      ICD-10-CM ICD-9-CM    1. Anxiety  F41.9 300.00 clonazePAM (KlonoPIN) 1 mg tablet   2. Chronic midline low back pain with bilateral sciatica  M54.41 724.2 traMADoL (ULTRAM) 50 mg tablet    M54.42 724.3     G89.29 338.29    3. Encounter for drug screening  Z02.83 V72.85 [facility] ()       1. Anxiety  Can continue taper until tapered off medication or he has found a provider willing to take over medication management. - clonazePAM (KlonoPIN) 1 mg tablet; Take 1/2 tab in am and 1 tab daily as needed for anxiety. Dispense: 45 Tablet; Refill: 0    2. Chronic midline low back pain with bilateral sciatica  Tapering dose ordered. Pt encouraged to see a pain specialist for ongoing treatment if he so desires. - traMADoL (ULTRAM) 50 mg tablet; Take 1 Tablet by mouth two (2) times a day for 7 days, THEN 1 Tablet daily as needed for Pain for 7 days, THEN 1 Tablet every other day for 7 days. Max Daily Amount: 100 mg. Dispense: 24 Tablet; Refill: 0    3. Encounter for drug screening  Review urine results when available. - Delcia Killer 13 (MW)    I have discussed the diagnosis with the patient and the intended plan as seen in the above orders. Pt/caretaker has expressed understanding. Questions were answered concerning future plans. I have discussed medication side effects and warnings as indicated with the patient as well. Napoleon Dietz NP     You were advised to call your insurance or go to your insurance web site for a list of psychiatrists that take your insurance. Call for an appointment for medication management of anxiety disorder and when you have found a psychiatrist, call the office and a referral can be made. You have been provided a list of pain specialists. Kristie Davila

## 2021-07-13 LAB — DRUGS UR: NORMAL

## 2021-08-05 DIAGNOSIS — F41.9 ANXIETY: ICD-10-CM

## 2021-08-06 RX ORDER — CLONAZEPAM 1 MG/1
1 TABLET ORAL
Qty: 30 TABLET | Refills: 0 | Status: SHIPPED | OUTPATIENT
Start: 2021-08-06

## 2021-08-06 RX ORDER — CLONAZEPAM 1 MG/1
TABLET ORAL
Qty: 45 TABLET | Refills: 0 | OUTPATIENT
Start: 2021-08-06

## 2021-08-06 NOTE — PROGRESS NOTES
Preoperative Evaluation    Date of Exam: 2021    Ana Sarmiento is a 46 y.o. male (:1970) who presents for preoperative evaluation. Procedure/Surgery:***  Date of Procedure/Surgery: ***  Surgeon: ***  Hospital/Surgical Facility: [facility] [id number]}  Primary Physician: Sarah Jones DO  Latex Allergy: {yes/ no default no:::\"no\"}    {HISTORY MED SURG SOC BSI YVZ:78335}    Recent use of: {PREOP RECENT MEDS:::\"No recent use of aspirin (ASA), NSAIDS or steroids\"}    Tetanus up to date: {Tetanus status:5746}      Anesthesia Complications: {YES PERSONAL HX FAM HX NONE:::\"None\"}  History of abnormal bleeding : {YES PERSONAL HX FAM HX NONE:::\"None\"}  History of Blood Transfusions: {Yes/No:::\"no\"}  Health Care Directive or Living Will: {Yes/No:::\"no\"}    REVIEW OF SYSTEMS:  {Ros - complete:36424}    EXAM:   There were no vitals taken for this visit. {PE ADULT/PEDS  MALE/FEMALE:}      DIAGNOSTICS:   1. EKG: EKG FINDINGS - {Findings; ec::\"normal EKG, normal sinus rhythm\",\"unchanged from previous tracings\"}  2. CXR: {Findings; cxr:91330}  3.  Labs: {Latest Lab Result Choices:9121895}    IMPRESSION:   {Preop risk:99026}  {Preop assessment:92945}    Arsh Juan NP   2021

## 2022-03-18 PROBLEM — I10 ESSENTIAL HYPERTENSION: Status: ACTIVE | Noted: 2020-06-26

## 2022-03-19 PROBLEM — I82.409 DVT, LOWER EXTREMITY (HCC): Status: ACTIVE | Noted: 2020-06-26

## 2022-03-19 PROBLEM — D68.51 FACTOR V LEIDEN (HCC): Status: ACTIVE | Noted: 2021-07-08

## 2022-03-19 PROBLEM — F41.9 ANXIETY: Status: ACTIVE | Noted: 2020-06-26

## 2022-03-19 PROBLEM — E66.01 OBESITY, MORBID (HCC): Status: ACTIVE | Noted: 2020-09-30

## 2023-05-26 RX ORDER — CETIRIZINE HYDROCHLORIDE 10 MG/1
10 TABLET ORAL DAILY
COMMUNITY
Start: 2021-03-31

## 2023-05-26 RX ORDER — LISINOPRIL 20 MG/1
20 TABLET ORAL DAILY
COMMUNITY
Start: 2020-09-30

## 2023-05-26 RX ORDER — CLONAZEPAM 1 MG/1
1 TABLET ORAL DAILY PRN
COMMUNITY
Start: 2021-08-06